# Patient Record
Sex: MALE | Race: WHITE | NOT HISPANIC OR LATINO | URBAN - METROPOLITAN AREA
[De-identification: names, ages, dates, MRNs, and addresses within clinical notes are randomized per-mention and may not be internally consistent; named-entity substitution may affect disease eponyms.]

---
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
## 2023-05-23 NOTE — ED PEDIATRIC NURSE NOTE - CAS TRG GEN SKIN COLOR
Normal for race
Performing Laboratory: [phone]
Bill For Surgical Tray: no
Expected Date Of Service: 05/23/2023
Billing Type: Third-Party Bill

## 2024-01-08 ENCOUNTER — OFFICE (OUTPATIENT)
Dept: URBAN - METROPOLITAN AREA CLINIC 6 | Facility: CLINIC | Age: 19
Setting detail: OPHTHALMOLOGY
End: 2024-01-08
Payer: COMMERCIAL

## 2024-01-08 DIAGNOSIS — Z01.00: ICD-10-CM

## 2024-01-08 PROBLEM — H52.7 REFRACTIVE ERROR ; BOTH EYES: Status: ACTIVE | Noted: 2024-01-08

## 2024-01-08 PROCEDURE — 92004 COMPRE OPH EXAM NEW PT 1/>: CPT

## 2024-01-08 ASSESSMENT — REFRACTION_MANIFEST
OS_SPHERE: -0.25
OD_AXIS: 170
OS_CYLINDER: -1.50
OD_AXIS: 177
OD_SPHERE: -1.00
OD_CYLINDER: -1.25
OS_AXIS: 5
OS_AXIS: 180
OD_CYLINDER: -1.00
OD_VA1: 20/25
OS_CYLINDER: -0.75
OS_VA1: 20/25
OS_VA1: 20/20
OD_VA1: 20/20
OS_SPHERE: -0.75
OD_SPHERE: -0.75

## 2024-01-08 ASSESSMENT — REFRACTION_CURRENTRX
OD_CYLINDER: -1.00
OD_AXIS: 169
OS_OVR_VA: 20/
OS_CYLINDER: -1.25
OD_OVR_VA: 20/
OS_VPRISM_DIRECTION: SV
OS_AXIS: 175
OS_SPHERE: -0.50
OD_VPRISM_DIRECTION: SV
OD_SPHERE: -1.00

## 2024-01-08 ASSESSMENT — SPHEQUIV_DERIVED
OD_SPHEQUIV: -1.625
OS_SPHEQUIV: -1.5
OS_SPHEQUIV: -0.625
OD_SPHEQUIV: -1.25
OS_SPHEQUIV: -1.5
OD_SPHEQUIV: -1.625

## 2024-01-08 ASSESSMENT — LID EXAM ASSESSMENTS
OD_BLEPHARITIS: RLL T
OS_BLEPHARITIS: LLL T

## 2024-01-08 ASSESSMENT — REFRACTION_AUTOREFRACTION
OS_CYLINDER: -1.50
OS_SPHERE: -0.75
OS_AXIS: 008
OD_AXIS: 170
OD_SPHERE: -1.00
OD_CYLINDER: -1.25

## 2024-01-08 ASSESSMENT — CONFRONTATIONAL VISUAL FIELD TEST (CVF)
OS_FINDINGS: FULL
OD_FINDINGS: FULL